# Patient Record
Sex: FEMALE | Race: WHITE | NOT HISPANIC OR LATINO | Employment: UNEMPLOYED | URBAN - METROPOLITAN AREA
[De-identification: names, ages, dates, MRNs, and addresses within clinical notes are randomized per-mention and may not be internally consistent; named-entity substitution may affect disease eponyms.]

---

## 2018-11-15 ENCOUNTER — TELEPHONE (OUTPATIENT)
Dept: TRANSPLANT | Facility: CLINIC | Age: 58
End: 2018-11-15

## 2018-11-15 DIAGNOSIS — Z00.5 TRANSPLANT DONOR EVALUATION: Primary | ICD-10-CM

## 2018-11-15 NOTE — TELEPHONE ENCOUNTER
"[facility] B[facility]ZE  [id number]      LIVING KIDNEY DONOR EVALUATION  Donor First Name Grace Donor MRN    Donor Last Name Fabio Completed 2018 3:58 PM    1960 Record ID [id number]   BREEZE Screen PASSED     Intended Recipient  Recipient First Name Eddie Recipient MRN    Recipient Last Name Bethany Relationship Niece or Nephew   Recipient  1944 Recipient Diagnosis    Recipient's ABO      Donor Information  Age 58 Gender Female   Ht 163 cm (5' 4'') Race    Wt 83.9 kg (185 lbs) Ethnicity Not /   BMI 31.80 kg/m  Preferred Language English      Required No     Blood Type A   Demographics  Home Address [address] [place] [address] # [phone]   [facility] Type Mobile   State [place] Alternate #    Zip Code [address] Type    Country [place] Preferred Contact day Mon, Fri, Thur, Wed, Tue   Email [email or web address] Preferred Contact time 11:00 AM-1:00 PM, 1:00 PM-4:00 PM, 09:00 AM-11:00 AM   &&   Donor's Medical Information  Medical History \"Hysterectomy\"   Depression   Endometriosis   History of pregnancy   Seasonal Affective Disorder   Stress Tests, Normal    other (\"Anxiety\")   other (\"Vertigo\")   s/p  Medications Hydrochlorothiazide   Wellbutrin XL   Surgical History Bladder Surgery, NOS      Hysterectomy Allergies Contrast (X-Ray Dye) : Rash   Contrast dye (ct scans) : Rash   Social History EtOH: Rare (1-2 drinks/year)   Illicit Drug Use: Denies   Tobacco: Remote; Quit ; ( ppd x 4 years) Self-Reported Functional Status \"I am able to participate in strenuous sports such as swimming, singles tennis, football, basketball, or skiing\"   Family Medical History Cancer (Sibling)   Diabetes (denies)   Heart Disease (Mother)   Hypertension (Father)   Kidney Disease (Aunt or Uncle)   Kidney Stones (denies) Exercise Frequency Exercise (Not on a regular basis)   Review of Organ Systems  Review of Systems Airway or Lungs: No   Blood " Disorder: No   Cancer: No   Diabetes,Thyroid,Adrenal,Endocrine Disorder: No   Digestive or Liver: No   Female Health: Yes   Heart or Circulatory System: No   Immune Diseases: No   Kidneys and Bladder: No   Muscles,Bones,Joints: No   Neuro: No   Psych: Yes   &&   Donor's Social Information  Marital Status  Living Accommodation Owns own home/apartment   Level of Education College or baccalaureate degree complete Living Arrangement With spouse   Employment Status Unemployed Concerns: health and life insurance No   Employer  Concerns: job security and lost income No   Occupation      Medical Insurance Status Has medical insurance     High Risk Behavior  High Risk Behaviors Blood transfusion < 12 months. (NO)   Commercial sex < 12 months. (NO)   Illicit IV drug use < 5yrs. (NO)   Other high risk sexual contact < 12 months. (NO)   Reason for Donation  Referral Friend or Family of Tx Candidate Reason for Donation It would be for my uncle.   Permission to Disclose Inquiry Yes Patient Comments    Donor Motivation Level Highly motivated donor     PCP Contact  [facility]   PCP Phone [phone]   Emergency Contact  First Name Zan First Name Zan   Last Name Fabio Sr. Last Name Fabio Torres.   Phone # [phone] Phone # [phone]   Phone Type Mobile Phone Type Mobile   Relationship Spouse Relationship Child   Office Use  Reviewed By    Reviewed 11/9/2018 9:02 AM   Admin Folder Accept   Comments 11/9 - Passed Eval   Lost for Followup    Extended Comments    [facility] ID [email or web address]:[id number] survey status completed   Activity History  Call  Task    Due Date 11/15/2018   Last Modified Date/Time 11/15/2018 9:05 AM   Comments    Call  Task    Due Date 11/9/2018   Last Modified Date/Time 11/9/2018 9:25 AM   Comments

## 2018-11-15 NOTE — TELEPHONE ENCOUNTER
Is abo incompat. Needs to think over PEP. Takes hydrochlorothiazide for vertigo d/t fluid in ears which is helping she states.Will send Phase 1 orders/pkt.

## 2020-07-16 ENCOUNTER — NURSE TRIAGE (OUTPATIENT)
Dept: NURSING | Facility: CLINIC | Age: 60
End: 2020-07-16

## 2020-07-16 DIAGNOSIS — Z11.59 SCREENING FOR VIRAL DISEASE: Primary | ICD-10-CM

## 2020-07-16 NOTE — TELEPHONE ENCOUNTER
"Patient is calling requesting COVID serologic antibody testing.  NOTE: Serologic testing is a blood test for 'antibodies' which are made at 10-14 days after you have had symptoms of COVID or were exposed and had an asymptomatic infection.  This does NOT test you for 'active' infection or tell you if you are contagious.    Are you a healthcare worker?  No  Do you currently have a cough, fever, body aches, shortness of breath, or difficulty breathing?  No  Did you previously have cough, fever, body aches, shortness of breath, or difficulty breathing that have now resolved? Has had previous covid symptoms.   Symptoms began 210 days ago.  Symptoms started > 14 days ago. Lab order placed per SARS-CoV-2 Serology test Standing Order using indication \"Previously symptomatic >14d since onset, currently asymptomatic\" and diagnosis code \"Screening for viral disease\" (Z11.59)      The patient was informed: \"Testing is limited each day and it may take time for testing to be available to everyone who has called. You will receive a call within 48-72 hours to schedule the serology testing. Please confirm the best number to reach you is [phone]. If you have any questions about scheduling, call [phone].\"   Jessa Lezama RN  [facility]    "

## 2020-08-03 DIAGNOSIS — Z11.59 SCREENING FOR VIRAL DISEASE: ICD-10-CM
